# Patient Record
Sex: MALE | Race: WHITE | NOT HISPANIC OR LATINO | Employment: FULL TIME | URBAN - METROPOLITAN AREA
[De-identification: names, ages, dates, MRNs, and addresses within clinical notes are randomized per-mention and may not be internally consistent; named-entity substitution may affect disease eponyms.]

---

## 2017-06-30 ENCOUNTER — APPOINTMENT (OUTPATIENT)
Dept: LAB | Facility: HOSPITAL | Age: 42
End: 2017-06-30
Payer: COMMERCIAL

## 2017-06-30 ENCOUNTER — TRANSCRIBE ORDERS (OUTPATIENT)
Dept: LAB | Facility: HOSPITAL | Age: 42
End: 2017-06-30

## 2017-06-30 DIAGNOSIS — E71.41 PRIMARY CARNITINE DEFICIENCY (HCC): ICD-10-CM

## 2017-06-30 DIAGNOSIS — Z00.00 ENCOUNTER FOR GENERAL ADULT MEDICAL EXAMINATION WITHOUT ABNORMAL FINDINGS: Primary | ICD-10-CM

## 2017-06-30 LAB
ALBUMIN SERPL BCP-MCNC: 4.7 G/DL (ref 3.5–5)
ALP SERPL-CCNC: 72 U/L (ref 46–116)
ALT SERPL W P-5'-P-CCNC: 36 U/L (ref 12–78)
ANION GAP SERPL CALCULATED.3IONS-SCNC: 6 MMOL/L (ref 4–13)
AST SERPL W P-5'-P-CCNC: 67 U/L (ref 5–45)
BACTERIA UR QL AUTO: ABNORMAL /HPF
BASOPHILS # BLD AUTO: 0.03 THOUSANDS/ΜL (ref 0–0.1)
BASOPHILS NFR BLD AUTO: 1 % (ref 0–1)
BILIRUB SERPL-MCNC: 0.83 MG/DL (ref 0.2–1)
BILIRUB UR QL STRIP: ABNORMAL
BUN SERPL-MCNC: 12 MG/DL (ref 5–25)
CALCIUM SERPL-MCNC: 9.8 MG/DL (ref 8.3–10.1)
CHLORIDE SERPL-SCNC: 105 MMOL/L (ref 100–108)
CHOLEST SERPL-MCNC: 165 MG/DL (ref 50–200)
CLARITY UR: CLEAR
CO2 SERPL-SCNC: 28 MMOL/L (ref 21–32)
COLOR UR: ABNORMAL
CREAT SERPL-MCNC: 1.04 MG/DL (ref 0.6–1.3)
EOSINOPHIL # BLD AUTO: 0.09 THOUSAND/ΜL (ref 0–0.61)
EOSINOPHIL NFR BLD AUTO: 2 % (ref 0–6)
ERYTHROCYTE [DISTWIDTH] IN BLOOD BY AUTOMATED COUNT: 12.3 % (ref 11.6–15.1)
GFR SERPL CREATININE-BSD FRML MDRD: >60 ML/MIN/1.73SQ M
GLUCOSE P FAST SERPL-MCNC: 97 MG/DL (ref 65–99)
GLUCOSE UR STRIP-MCNC: NEGATIVE MG/DL
HCT VFR BLD AUTO: 46.1 % (ref 36.5–49.3)
HDLC SERPL-MCNC: 77 MG/DL (ref 40–60)
HGB BLD-MCNC: 15.3 G/DL (ref 12–17)
HGB UR QL STRIP.AUTO: NEGATIVE
HYALINE CASTS #/AREA URNS LPF: ABNORMAL /LPF
KETONES UR STRIP-MCNC: ABNORMAL MG/DL
LDLC SERPL CALC-MCNC: 80 MG/DL (ref 0–100)
LEUKOCYTE ESTERASE UR QL STRIP: ABNORMAL
LYMPHOCYTES # BLD AUTO: 1.7 THOUSANDS/ΜL (ref 0.6–4.47)
LYMPHOCYTES NFR BLD AUTO: 35 % (ref 14–44)
MCH RBC QN AUTO: 30.1 PG (ref 26.8–34.3)
MCHC RBC AUTO-ENTMCNC: 33.2 G/DL (ref 31.4–37.4)
MCV RBC AUTO: 91 FL (ref 82–98)
MONOCYTES # BLD AUTO: 0.44 THOUSAND/ΜL (ref 0.17–1.22)
MONOCYTES NFR BLD AUTO: 9 % (ref 4–12)
NEUTROPHILS # BLD AUTO: 2.55 THOUSANDS/ΜL (ref 1.85–7.62)
NEUTS SEG NFR BLD AUTO: 53 % (ref 43–75)
NITRITE UR QL STRIP: NEGATIVE
NON-SQ EPI CELLS URNS QL MICRO: ABNORMAL /HPF
NRBC BLD AUTO-RTO: 0 /100 WBCS
PH UR STRIP.AUTO: 8.5 [PH] (ref 4.5–8)
PLATELET # BLD AUTO: 223 THOUSANDS/UL (ref 149–390)
PMV BLD AUTO: 10.5 FL (ref 8.9–12.7)
POTASSIUM SERPL-SCNC: 4.8 MMOL/L (ref 3.5–5.3)
PROT SERPL-MCNC: 8 G/DL (ref 6.4–8.2)
PROT UR STRIP-MCNC: ABNORMAL MG/DL
PSA SERPL-MCNC: 0.6 NG/ML (ref 0–4)
RBC # BLD AUTO: 5.08 MILLION/UL (ref 3.88–5.62)
RBC #/AREA URNS AUTO: ABNORMAL /HPF
SODIUM SERPL-SCNC: 139 MMOL/L (ref 136–145)
SP GR UR STRIP.AUTO: 1.03 (ref 1–1.03)
TRIGL SERPL-MCNC: 40 MG/DL
UROBILINOGEN UR QL STRIP.AUTO: 1 E.U./DL
WBC # BLD AUTO: 4.82 THOUSAND/UL (ref 4.31–10.16)
WBC #/AREA URNS AUTO: ABNORMAL /HPF

## 2017-06-30 PROCEDURE — 81001 URINALYSIS AUTO W/SCOPE: CPT | Performed by: INTERNAL MEDICINE

## 2017-06-30 PROCEDURE — G0103 PSA SCREENING: HCPCS

## 2017-06-30 PROCEDURE — 36415 COLL VENOUS BLD VENIPUNCTURE: CPT

## 2017-06-30 PROCEDURE — 80053 COMPREHEN METABOLIC PANEL: CPT

## 2017-06-30 PROCEDURE — 85025 COMPLETE CBC W/AUTO DIFF WBC: CPT

## 2017-06-30 PROCEDURE — 80061 LIPID PANEL: CPT

## 2017-07-03 ENCOUNTER — APPOINTMENT (OUTPATIENT)
Dept: LAB | Facility: HOSPITAL | Age: 42
End: 2017-07-03
Payer: COMMERCIAL

## 2017-07-03 DIAGNOSIS — Z00.00 ENCOUNTER FOR GENERAL ADULT MEDICAL EXAMINATION WITHOUT ABNORMAL FINDINGS: ICD-10-CM

## 2017-07-03 LAB
BILIRUB UR QL STRIP: NEGATIVE
CLARITY UR: ABNORMAL
COLOR UR: YELLOW
FOLATE SERPL-MCNC: 15.8 NG/ML (ref 3.1–17.5)
GLUCOSE UR STRIP-MCNC: NEGATIVE MG/DL
HGB UR QL STRIP.AUTO: NEGATIVE
KETONES UR STRIP-MCNC: NEGATIVE MG/DL
LEUKOCYTE ESTERASE UR QL STRIP: NEGATIVE
NITRITE UR QL STRIP: NEGATIVE
PH UR STRIP.AUTO: 8.5 [PH] (ref 4.5–8)
PROT UR STRIP-MCNC: NEGATIVE MG/DL
SP GR UR STRIP.AUTO: 1.02 (ref 1–1.03)
UROBILINOGEN UR QL STRIP.AUTO: 1 E.U./DL
VIT B12 SERPL-MCNC: 635 PG/ML (ref 100–900)

## 2017-07-03 PROCEDURE — 36415 COLL VENOUS BLD VENIPUNCTURE: CPT

## 2017-07-03 PROCEDURE — 84630 ASSAY OF ZINC: CPT

## 2017-07-03 PROCEDURE — 82607 VITAMIN B-12: CPT

## 2017-07-03 PROCEDURE — 81003 URINALYSIS AUTO W/O SCOPE: CPT | Performed by: INTERNAL MEDICINE

## 2017-07-03 PROCEDURE — 82746 ASSAY OF FOLIC ACID SERUM: CPT

## 2017-07-03 PROCEDURE — 87086 URINE CULTURE/COLONY COUNT: CPT

## 2017-07-04 LAB — BACTERIA UR CULT: NORMAL

## 2017-07-06 LAB — ZINC SERPL-MCNC: 86 UG/DL (ref 56–134)

## 2017-07-12 LAB
CARN ESTERS/C0 SERPL-SRTO: 0.5 RATIO (ref 0.1–0.9)
CARNITINE FREE SERPL-SCNC: 38 UMOL/L (ref 16–60)
CARNITINE SERPL-SCNC: 56 UMOL/L (ref 25–69)
STONE ANALYSIS-IMP: NORMAL

## 2018-06-15 ENCOUNTER — TRANSCRIBE ORDERS (OUTPATIENT)
Dept: LAB | Facility: HOSPITAL | Age: 43
End: 2018-06-15

## 2018-06-15 ENCOUNTER — APPOINTMENT (OUTPATIENT)
Dept: LAB | Facility: HOSPITAL | Age: 43
End: 2018-06-15
Payer: COMMERCIAL

## 2018-06-15 DIAGNOSIS — A51.31 CONDYLOMA LATUM: ICD-10-CM

## 2018-06-15 DIAGNOSIS — Z00.01 ENCOUNTER FOR GENERAL ADULT MEDICAL EXAMINATION WITH ABNORMAL FINDINGS: ICD-10-CM

## 2018-06-15 DIAGNOSIS — A51.31 CONDYLOMA LATUM: Primary | ICD-10-CM

## 2018-06-15 LAB
25(OH)D3 SERPL-MCNC: 35.4 NG/ML (ref 30–100)
ALBUMIN SERPL BCP-MCNC: 4.7 G/DL (ref 3.5–5)
ALP SERPL-CCNC: 66 U/L (ref 46–116)
ALT SERPL W P-5'-P-CCNC: 24 U/L (ref 12–78)
ANION GAP SERPL CALCULATED.3IONS-SCNC: 3 MMOL/L (ref 4–13)
AST SERPL W P-5'-P-CCNC: 18 U/L (ref 5–45)
BACTERIA UR QL AUTO: ABNORMAL /HPF
BASOPHILS # BLD AUTO: 0.04 THOUSANDS/ΜL (ref 0–0.1)
BASOPHILS NFR BLD AUTO: 1 % (ref 0–1)
BILIRUB SERPL-MCNC: 0.96 MG/DL (ref 0.2–1)
BILIRUB UR QL STRIP: NEGATIVE
BUN SERPL-MCNC: 11 MG/DL (ref 5–25)
CALCIUM SERPL-MCNC: 9.6 MG/DL (ref 8.3–10.1)
CHLORIDE SERPL-SCNC: 104 MMOL/L (ref 100–108)
CHOLEST SERPL-MCNC: 137 MG/DL (ref 50–200)
CLARITY UR: ABNORMAL
CO2 SERPL-SCNC: 31 MMOL/L (ref 21–32)
COLOR UR: ABNORMAL
CREAT SERPL-MCNC: 0.96 MG/DL (ref 0.6–1.3)
EOSINOPHIL # BLD AUTO: 0.06 THOUSAND/ΜL (ref 0–0.61)
EOSINOPHIL NFR BLD AUTO: 1 % (ref 0–6)
ERYTHROCYTE [DISTWIDTH] IN BLOOD BY AUTOMATED COUNT: 11.8 % (ref 11.6–15.1)
FOLATE SERPL-MCNC: >20 NG/ML (ref 3.1–17.5)
GFR SERPL CREATININE-BSD FRML MDRD: 98 ML/MIN/1.73SQ M
GLUCOSE P FAST SERPL-MCNC: 93 MG/DL (ref 65–99)
GLUCOSE UR STRIP-MCNC: NEGATIVE MG/DL
HCT VFR BLD AUTO: 47.5 % (ref 36.5–49.3)
HDLC SERPL-MCNC: 62 MG/DL (ref 40–60)
HGB BLD-MCNC: 15.1 G/DL (ref 12–17)
HGB UR QL STRIP.AUTO: NEGATIVE
HYALINE CASTS #/AREA URNS LPF: ABNORMAL /LPF
IMM GRANULOCYTES # BLD AUTO: 0.02 THOUSAND/UL (ref 0–0.2)
IMM GRANULOCYTES NFR BLD AUTO: 0 % (ref 0–2)
KETONES UR STRIP-MCNC: NEGATIVE MG/DL
LDLC SERPL CALC-MCNC: 62 MG/DL (ref 0–100)
LDLC SERPL DIRECT ASSAY-MCNC: 72 MG/DL (ref 0–100)
LEUKOCYTE ESTERASE UR QL STRIP: NEGATIVE
LYMPHOCYTES # BLD AUTO: 1.36 THOUSANDS/ΜL (ref 0.6–4.47)
LYMPHOCYTES NFR BLD AUTO: 19 % (ref 14–44)
MCH RBC QN AUTO: 29.3 PG (ref 26.8–34.3)
MCHC RBC AUTO-ENTMCNC: 31.8 G/DL (ref 31.4–37.4)
MCV RBC AUTO: 92 FL (ref 82–98)
MONOCYTES # BLD AUTO: 0.46 THOUSAND/ΜL (ref 0.17–1.22)
MONOCYTES NFR BLD AUTO: 7 % (ref 4–12)
NEUTROPHILS # BLD AUTO: 5.08 THOUSANDS/ΜL (ref 1.85–7.62)
NEUTS SEG NFR BLD AUTO: 72 % (ref 43–75)
NITRITE UR QL STRIP: NEGATIVE
NON-SQ EPI CELLS URNS QL MICRO: ABNORMAL /HPF
NONHDLC SERPL-MCNC: 75 MG/DL
NRBC BLD AUTO-RTO: 0 /100 WBCS
PH UR STRIP.AUTO: 8.5 [PH] (ref 4.5–8)
PLATELET # BLD AUTO: 230 THOUSANDS/UL (ref 149–390)
PMV BLD AUTO: 10.5 FL (ref 8.9–12.7)
POTASSIUM SERPL-SCNC: 4.3 MMOL/L (ref 3.5–5.3)
PROT SERPL-MCNC: 7.7 G/DL (ref 6.4–8.2)
PROT UR STRIP-MCNC: ABNORMAL MG/DL
RBC # BLD AUTO: 5.16 MILLION/UL (ref 3.88–5.62)
RBC #/AREA URNS AUTO: ABNORMAL /HPF
RPR SER QL: NORMAL
SODIUM SERPL-SCNC: 138 MMOL/L (ref 136–145)
SP GR UR STRIP.AUTO: 1.02 (ref 1–1.03)
TRIGL SERPL-MCNC: 65 MG/DL
UROBILINOGEN UR QL STRIP.AUTO: 1 E.U./DL
VIT B12 SERPL-MCNC: 845 PG/ML (ref 100–900)
WBC # BLD AUTO: 7.02 THOUSAND/UL (ref 4.31–10.16)
WBC #/AREA URNS AUTO: ABNORMAL /HPF

## 2018-06-15 PROCEDURE — 83721 ASSAY OF BLOOD LIPOPROTEIN: CPT

## 2018-06-15 PROCEDURE — 86695 HERPES SIMPLEX TYPE 1 TEST: CPT

## 2018-06-15 PROCEDURE — 80053 COMPREHEN METABOLIC PANEL: CPT

## 2018-06-15 PROCEDURE — 82607 VITAMIN B-12: CPT

## 2018-06-15 PROCEDURE — 82306 VITAMIN D 25 HYDROXY: CPT

## 2018-06-15 PROCEDURE — 86696 HERPES SIMPLEX TYPE 2 TEST: CPT

## 2018-06-15 PROCEDURE — 81001 URINALYSIS AUTO W/SCOPE: CPT

## 2018-06-15 PROCEDURE — 86592 SYPHILIS TEST NON-TREP QUAL: CPT

## 2018-06-15 PROCEDURE — 80061 LIPID PANEL: CPT

## 2018-06-15 PROCEDURE — 87491 CHLMYD TRACH DNA AMP PROBE: CPT

## 2018-06-15 PROCEDURE — 87591 N.GONORRHOEAE DNA AMP PROB: CPT

## 2018-06-15 PROCEDURE — 36415 COLL VENOUS BLD VENIPUNCTURE: CPT

## 2018-06-15 PROCEDURE — 82746 ASSAY OF FOLIC ACID SERUM: CPT

## 2018-06-15 PROCEDURE — 85025 COMPLETE CBC W/AUTO DIFF WBC: CPT

## 2018-06-16 LAB
HSV1 IGG SER IA-ACNC: 51.7 INDEX (ref 0–0.9)
HSV2 IGG SER IA-ACNC: <0.91 INDEX (ref 0–0.9)

## 2018-06-18 LAB
CHLAMYDIA DNA CVX QL NAA+PROBE: NORMAL
N GONORRHOEA DNA GENITAL QL NAA+PROBE: NORMAL

## 2018-06-19 LAB
CARN ESTERS/C0 SERPL-SRTO: 0.5 RATIO (ref 0–0.9)
CARNITINE FREE SERPL-SCNC: 27 UMOL/L (ref 20–55)
CARNITINE SERPL-SCNC: 40 UMOL/L (ref 27–73)
STONE ANALYSIS-IMP: NORMAL

## 2018-07-09 ENCOUNTER — APPOINTMENT (OUTPATIENT)
Dept: LAB | Facility: HOSPITAL | Age: 43
End: 2018-07-09
Payer: COMMERCIAL

## 2018-07-09 DIAGNOSIS — Z00.01 ENCOUNTER FOR GENERAL ADULT MEDICAL EXAMINATION WITH ABNORMAL FINDINGS: ICD-10-CM

## 2018-07-09 DIAGNOSIS — A51.31 CONDYLOMA LATUM: ICD-10-CM

## 2018-07-09 PROCEDURE — 86696 HERPES SIMPLEX TYPE 2 TEST: CPT

## 2018-07-09 PROCEDURE — 86695 HERPES SIMPLEX TYPE 1 TEST: CPT

## 2018-07-10 LAB
HSV1 IGG SER IA-ACNC: 51.9 INDEX (ref 0–0.9)
HSV2 IGG SER IA-ACNC: <0.91 INDEX (ref 0–0.9)

## 2018-08-01 ENCOUNTER — OFFICE VISIT (OUTPATIENT)
Dept: UROLOGY | Facility: AMBULATORY SURGERY CENTER | Age: 43
End: 2018-08-01
Payer: COMMERCIAL

## 2018-08-01 ENCOUNTER — TELEPHONE (OUTPATIENT)
Dept: UROLOGY | Facility: AMBULATORY SURGERY CENTER | Age: 43
End: 2018-08-01

## 2018-08-01 VITALS
DIASTOLIC BLOOD PRESSURE: 68 MMHG | SYSTOLIC BLOOD PRESSURE: 110 MMHG | WEIGHT: 143.4 LBS | BODY MASS INDEX: 20.08 KG/M2 | HEART RATE: 66 BPM | HEIGHT: 71 IN

## 2018-08-01 DIAGNOSIS — A63.0 CONDYLOMA: ICD-10-CM

## 2018-08-01 DIAGNOSIS — B00.9 HERPES SIMPLEX: Primary | ICD-10-CM

## 2018-08-01 LAB
SL AMB  POCT GLUCOSE, UA: NORMAL
SL AMB LEUKOCYTE ESTERASE,UA: NORMAL
SL AMB POCT BILIRUBIN,UA: NORMAL
SL AMB POCT BLOOD,UA: NORMAL
SL AMB POCT CLARITY,UA: CLEAR
SL AMB POCT COLOR,UA: YELLOW
SL AMB POCT KETONES,UA: NORMAL
SL AMB POCT NITRITE,UA: NORMAL
SL AMB POCT PH,UA: 8
SL AMB POCT SPECIFIC GRAVITY,UA: 1
SL AMB POCT URINE PROTEIN: NORMAL
SL AMB POCT UROBILINOGEN: NORMAL

## 2018-08-01 PROCEDURE — 81002 URINALYSIS NONAUTO W/O SCOPE: CPT | Performed by: UROLOGY

## 2018-08-01 PROCEDURE — 99203 OFFICE O/P NEW LOW 30 MIN: CPT | Performed by: UROLOGY

## 2018-08-01 RX ORDER — FINASTERIDE 1 MG/1
1 TABLET, FILM COATED ORAL
Refills: 2 | COMMUNITY
Start: 2018-06-02

## 2018-08-01 RX ORDER — VALACYCLOVIR HYDROCHLORIDE 1 G/1
1000 TABLET, FILM COATED ORAL EVERY 12 HOURS
Refills: 5 | COMMUNITY
Start: 2018-07-28 | End: 2018-11-08

## 2018-08-01 NOTE — PROGRESS NOTES
8/1/2018    Kilo Fall  1975  [id number]        Assessment  Possible right groin condyloma      Discussion  I recommend sharp excision of the right groin condyloma  The patient is concerned about scarring and has requested evaluation by Plastic surgery  A referral has been made  I also explained to the patient that with his negative HSV 2 antibody and current physical examination he does not appear to have active herpes  We did discuss discontinuing the valacyclovir  He will return in follow-up after his evaluation and excision with Plastic surgery is completed  History of Present Illness  41 y o  male with a history of a right groin skin lesion concerning for condyloma  The patient states he was treated with imiquimod  He also states that his family physician was concerned about possible genital herpes and started him on valacyclovir  HSV 2 antibody testing is negative for reactivity  He is not currently in a relationship  He denies any prior history of STDs  Recent chlamydia and gonorrhea testing are negative  RPR is negative as well  AUA Symptom Score  AUA SYMPTOM SCORE      Most Recent Value   AUA SYMPTOM SCORE   How often have you had a sensation of not emptying your bladder completely after you finished urinating? 0   How often have you had to urinate again less than two hours after you finished urinating? 2   How often have you found you stopped and started again several times when you urinate? 1   How often have you found it difficult to postpone urination? 3   How often have you had a weak urinary stream?  1   How often have you had to push or strain to begin urination? 0   How many times did you most typically get up to urinate from the time you went to bed at night until the time you got up in the morning?   1   Quality of Life: If you were to spend the rest of your life with your urinary condition just the way it is now, how would you feel about that?  2   AUA SYMPTOM SCORE  8          Review of Systems  Review of Systems   Constitutional: Negative  HENT: Negative  Eyes: Negative  Respiratory: Negative  Cardiovascular: Negative  Gastrointestinal: Negative  Endocrine: Negative  Genitourinary: Negative  Musculoskeletal: Negative  Skin: Negative  Allergic/Immunologic: Negative  Neurological: Negative  Hematological: Negative  Psychiatric/Behavioral: Negative  Past Medical History  Past Medical History:   Diagnosis Date    Genital warts     Herpes simplex        Past Social History  History reviewed  No pertinent surgical history  Past Family History  Family History   Problem Relation Age of Onset    Cancer Father     Cancer Mother     Heart attack Maternal Grandmother        Past Social history  Social History     Social History    Marital status: /Civil Union     Spouse name: N/A    Number of children: N/A    Years of education: N/A     Occupational History    Not on file  Social History Main Topics    Smoking status: Never Smoker    Smokeless tobacco: Never Used    Alcohol use Yes      Comment: Occasionally    Drug use: Unknown    Sexual activity: Not on file     Other Topics Concern    Not on file     Social History Narrative    No narrative on file       Current Medications  Current Outpatient Prescriptions   Medication Sig Dispense Refill    finasteride (PROPECIA) 1 MG tablet Take 1 mg by mouth daily  2    MINOXIDIL, TOPICAL, 5 % SOLN Apply topically 2 (two) times a day      valACYclovir (VALTREX) 1,000 mg tablet Take 1,000 mg by mouth every 12 (twelve) hours  5     No current facility-administered medications for this visit  Allergies  No Known Allergies    Past Medical History, Social History, Family History, medications and allergies were reviewed      Vitals  Vitals:    08/01/18 0927   BP: 110/68   BP Location: Right arm   Patient Position: Sitting   Cuff Size: Adult   Pulse: 66 Weight: 65 kg (143 lb 6 4 oz)   Height: 5' 11" (1 803 m)       Physical Exam  Physical Exam  On examination he is in no acute distress  His abdomen is soft nontender nondistended  In the right groin a 1 cm x 1 cm raised skin lesion possibly consistent with condyloma is identified  Mild hyperpigmentation of the skin just below and lateral to the skin lesion is identified  The patient states that this is secondary to the imiquimod  A 2nd smaller more lateral lesion is identified which does not appear to be consistent with condyloma      Results  Lab Results   Component Value Date    PSA 0 6 06/30/2017     Lab Results   Component Value Date    CALCIUM 9 6 06/15/2018     06/15/2018    K 4 3 06/15/2018    CO2 31 06/15/2018     06/15/2018    BUN 11 06/15/2018    CREATININE 0 96 06/15/2018     Lab Results   Component Value Date    WBC 7 02 06/15/2018    HGB 15 1 06/15/2018    HCT 47 5 06/15/2018    MCV 92 06/15/2018     06/15/2018         Office Urine Dip  Recent Results (from the past 1 hour(s))   POCT urine dip    Collection Time: 08/01/18  9:36 AM   Result Value Ref Range    LEUKOCYTE ESTERASE,UA -      NITRITE,UA -     SL AMB POCT UROBILINOGEN -     SL AMB POCT URINE PROTEIN 100 ++      PH,UA 8 0      BLOOD,UA -      SPECIFIC GRAVITY,UA 1 005      KETONES,UA -      BILIRUBIN,UA -     GLUCOSE, UA -      COLOR,UA yellow      CLARITY,UA clear    ]

## 2018-08-01 NOTE — LETTER
August 1, 2018     Kingston Sandhu MD  [address] [address]    Patient: Yazmin King   YOB: 1975   Date of Visit: 8/1/2018       Dear Dr Miguel Castle:    Thank you for referring Yazmin King to me for evaluation  Below are my notes for this consultation  If you have questions, please do not hesitate to call me  I look forward to following your patient along with you  Sincerely,        Nikos Wiggins MD        CC: MD Nikos Chauhan MD  8/1/2018 10:26 AM  Sign at close encounter  8/1/2018    Yazmin King  1975  [id number]        Assessment  Possible right groin condyloma      Discussion  I recommend sharp excision of the right groin condyloma  The patient is concerned about scarring and has requested evaluation by Plastic surgery  A referral has been made  I also explained to the patient that with his negative HSV 2 antibody and current physical examination he does not appear to have active herpes  We did discuss discontinuing the valacyclovir  He will return in follow-up after his evaluation and excision with Plastic surgery is completed  History of Present Illness  41 y o  male with a history of a right groin skin lesion concerning for condyloma  The patient states he was treated with imiquimod  He also states that his family physician was concerned about possible genital herpes and started him on valacyclovir  HSV 2 antibody testing is negative for reactivity  He is not currently in a relationship  He denies any prior history of STDs  Recent chlamydia and gonorrhea testing are negative  RPR is negative as well  AUA Symptom Score  AUA SYMPTOM SCORE      Most Recent Value   AUA SYMPTOM SCORE   How often have you had a sensation of not emptying your bladder completely after you finished urinating? 0   How often have you had to urinate again less than two hours after you finished urinating?   2   How often have you found you stopped and started again several times when you urinate? 1   How often have you found it difficult to postpone urination? 3   How often have you had a weak urinary stream?  1   How often have you had to push or strain to begin urination? 0   How many times did you most typically get up to urinate from the time you went to bed at night until the time you got up in the morning? 1   Quality of Life: If you were to spend the rest of your life with your urinary condition just the way it is now, how would you feel about that?  2   AUA SYMPTOM SCORE  8          Review of Systems  Review of Systems   Constitutional: Negative  HENT: Negative  Eyes: Negative  Respiratory: Negative  Cardiovascular: Negative  Gastrointestinal: Negative  Endocrine: Negative  Genitourinary: Negative  Musculoskeletal: Negative  Skin: Negative  Allergic/Immunologic: Negative  Neurological: Negative  Hematological: Negative  Psychiatric/Behavioral: Negative  Past Medical History  Past Medical History:   Diagnosis Date    Genital warts     Herpes simplex        Past Social History  History reviewed  No pertinent surgical history  Past Family History  Family History   Problem Relation Age of Onset    Cancer Father     Cancer Mother     Heart attack Maternal Grandmother        Past Social history  Social History     Social History    Marital status: /Civil Union     Spouse name: N/A    Number of children: N/A    Years of education: N/A     Occupational History    Not on file       Social History Main Topics    Smoking status: Never Smoker    Smokeless tobacco: Never Used    Alcohol use Yes      Comment: Occasionally    Drug use: Unknown    Sexual activity: Not on file     Other Topics Concern    Not on file     Social History Narrative    No narrative on file       Current Medications  Current Outpatient Prescriptions   Medication Sig Dispense Refill  finasteride (PROPECIA) 1 MG tablet Take 1 mg by mouth daily  2    MINOXIDIL, TOPICAL, 5 % SOLN Apply topically 2 (two) times a day      valACYclovir (VALTREX) 1,000 mg tablet Take 1,000 mg by mouth every 12 (twelve) hours  5     No current facility-administered medications for this visit  Allergies  No Known Allergies    Past Medical History, Social History, Family History, medications and allergies were reviewed  Vitals  Vitals:    08/01/18 0927   BP: 110/68   BP Location: Right arm   Patient Position: Sitting   Cuff Size: Adult   Pulse: 66   Weight: 65 kg (143 lb 6 4 oz)   Height: 5' 11" (1 803 m)       Physical Exam  Physical Exam  On examination he is in no acute distress  His abdomen is soft nontender nondistended  In the right groin a 1 cm x 1 cm raised skin lesion possibly consistent with condyloma is identified  Mild hyperpigmentation of the skin just below and lateral to the skin lesion is identified  The patient states that this is secondary to the imiquimod  A 2nd smaller more lateral lesion is identified which does not appear to be consistent with condyloma      Results  Lab Results   Component Value Date    PSA 0 6 06/30/2017     Lab Results   Component Value Date    CALCIUM 9 6 06/15/2018     06/15/2018    K 4 3 06/15/2018    CO2 31 06/15/2018     06/15/2018    BUN 11 06/15/2018    CREATININE 0 96 06/15/2018     Lab Results   Component Value Date    WBC 7 02 06/15/2018    HGB 15 1 06/15/2018    HCT 47 5 06/15/2018    MCV 92 06/15/2018     06/15/2018         Office Urine Dip  Recent Results (from the past 1 hour(s))   POCT urine dip    Collection Time: 08/01/18  9:36 AM   Result Value Ref Range    LEUKOCYTE ESTERASE,UA -      NITRITE,UA -     SL AMB POCT UROBILINOGEN -     SL AMB POCT URINE PROTEIN 100 ++      PH,UA 8 0      BLOOD,UA -      SPECIFIC GRAVITY,UA 1 005      KETONES,UA -      BILIRUBIN,UA -     GLUCOSE, UA -      COLOR,UA yellow      CLARITY,UA clear ]

## 2018-08-01 NOTE — TELEPHONE ENCOUNTER
Discussed situation with Dr Scott Bauer had me call [facility] to explain that lesion is not on the genitalia, that it was in the groin area  I called  Plastic surgery, [phone] and spoke with Heather Jara, she reported to me that Plastic surgery would not remove lesion on groin area  Reported back to Dr Scott Bauer, per Dr Scott Bauer, patient can be seen as office procedure to have groin lesion removed  I called patient, informed him that I had also called [facility], but was told they don't remove groin lesion  Informed patient Dr Scott Bauer would be willing to see him in the office to remove lesion  Patient reports concern with scarring  Patient states he is going to try to seek out another plastic surgeon   If patient unsuccessful, he will call our office to schedule office procedure for lesion removal

## 2018-08-01 NOTE — TELEPHONE ENCOUNTER
Spoke with patient, he said Dr Yonatan Jama referred him to plastic surgery for removal of genital warts  They do not remove the warts there  He wants to know who  He can go to now?

## 2018-09-07 ENCOUNTER — OFFICE VISIT (OUTPATIENT)
Dept: PLASTIC SURGERY | Facility: CLINIC | Age: 43
End: 2018-09-07
Payer: COMMERCIAL

## 2018-09-07 VITALS — HEIGHT: 71 IN | WEIGHT: 145 LBS | BODY MASS INDEX: 20.3 KG/M2

## 2018-09-07 DIAGNOSIS — Q17.3 LOP EAR: Primary | ICD-10-CM

## 2018-09-07 DIAGNOSIS — A63.0 CONDYLOMA: ICD-10-CM

## 2018-09-07 PROCEDURE — 99203 OFFICE O/P NEW LOW 30 MIN: CPT | Performed by: SURGERY

## 2018-09-07 NOTE — PROGRESS NOTES
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 who had a previous otoplasty as a [place]  The lop ear deformity recurred  He presents today for evaluation and correction  I recommended revision otoplasty  I would resect a portion of the medial posterior devang bowl and suture the cartilage back to mastoid fascia  I would also contour the helical rim with cartilage scoring and mustarde sutures  I discussed this plan with him in detail in measured as were obtained  I discussed risks, benefits and alternatives of the procedure including not limited to risk of bleeding, infection, scar and recurrent ear deformity  He understood and wished to proceed  I personally obtained his informed consent  We will submit to his insurance for prior authorization  Condyloma  46-year-old male with right inguinal region condylomata  He requested to see a plastic surgeon for excision and closure to minimize scarring  I am seeing him in consultation for urology  I recommended excision with complex closure  It he has condylomata in a 6 millimeters and 3 millimeters I discussed with him risks, benefits and alternatives of the procedure including not limited to risk of bleeding, infection, scar and recurrence  He understood and wished to proceed  I personally obtained his informed consent  We will schedule him for surgery on an outpatient basis  Diagnoses and all orders for this visit:    Lop ear    Condyloma  -     Ambulatory referral to Plastic Surgery          Subjective:      Patient ID: Monica Werner is a 41 y o  male  46-year-old male with right inguinal region condylomata  He requested to see a plastic surgeon for excision and closure to minimize scarring  I am seeing him in consultation for urology            The following portions of the patient's history were reviewed and updated as appropriate: allergies, current medications, past family history, past medical history, past social history, past surgical history and problem list     Review of Systems   Constitutional: Negative  HENT:        As noted in HPI   Eyes: Negative  Respiratory: Negative  Cardiovascular: Negative  Gastrointestinal: Negative  Endocrine: Negative  Genitourinary:        As noted in HPI   Musculoskeletal: Negative  Skin: Negative  Allergic/Immunologic: Negative  Neurological: Negative  Hematological: Negative  Psychiatric/Behavioral: Negative  Objective:      Ht 5' 11" (1 803 m)   Wt 65 8 kg (145 lb)   BMI 20 22 kg/m²          Physical Exam   HENT:   Right ear distance from helix to mastoid 7 millimeters, 7 millimeters, 7 millimeters, 7 millimeters and 9 millimeters    Left ear distance from helix to mastoid 7 millimeters, 13 millimeters, 16 millimeters, 20 millimeters, 9 millimeters     Genitourinary:   Genitourinary Comments: Condylomata 6 millimeters and 3 millimeters in right inguinal region

## 2018-11-08 RX ORDER — MULTIVITAMIN
1 CAPSULE ORAL DAILY
COMMUNITY

## 2018-11-08 RX ORDER — ACETAMINOPHEN 500 MG
500 TABLET ORAL EVERY 6 HOURS PRN
COMMUNITY
End: 2019-07-24 | Stop reason: ALTCHOICE

## 2018-11-08 RX ORDER — COVID-19 ANTIGEN TEST
KIT MISCELLANEOUS
COMMUNITY
End: 2019-07-24 | Stop reason: ALTCHOICE

## 2018-11-08 RX ORDER — CHLORAL HYDRATE 500 MG
1000 CAPSULE ORAL DAILY
COMMUNITY

## 2018-11-08 NOTE — PERIOPERATIVE NURSING NOTE
Patient states he doesn't have a  for surgery  He was instructed that he should call the surgeon's office to let them know this to see if they have any services available, and that he must have a  to take him home

## 2018-11-08 NOTE — PRE-PROCEDURE INSTRUCTIONS
Pre-Surgery Instructions:   Medication Instructions    acetaminophen (TYLENOL) 500 mg tablet Instructed patient per Anesthesia Guidelines   finasteride (PROPECIA) 1 MG tablet Instructed patient per Anesthesia Guidelines   MINOXIDIL, TOPICAL, 5 % SOLN Instructed patient per Anesthesia Guidelines   Multiple Vitamin (MULTIVITAMIN) capsule Instructed patient per Anesthesia Guidelines   Naproxen Sodium (ALEVE) 220 MG CAPS Instructed patient per Anesthesia Guidelines   Omega-3 Fatty Acids (FISH OIL) 1,000 mg Instructed patient per Anesthesia Guidelines  Per anesthesia patient was told to stop NSAIDS and supplements one week preop and no medications are needed on morning of surgery  Instructed on use of Chlorhexidine for preoperative bathing per hospital protocol

## 2018-11-22 NOTE — H&P
H&P - Plastic Surgery   Patito Evans 41 y o  male MRN: [id number]  Unit/Bed#:  Encounter: [id number]        Assessment/Plan:     Lop ear  24-year-old male with a lop ear deformity since birth who had a previous otop[place] as a [place]  The lop ear deformity recurred  He presents today for evaluation and correction      I recommended revision otoplasty  I would resect a portion of the medial posterior devang bowl and suture the cartilage back to mastoid fascia  I would also contour the helical rim with cartilage scoring and mustarde sutures  I discussed this plan with him in detail in measured as were obtained  I discussed risks, benefits and alternatives of the procedure including not limited to risk of bleeding, infection, scar and recurrent ear deformity  He understood and wished to proceed  I personally obtained his informed consent      We will submit to his insurance for prior authorization      Condyloma  24-year-old male with right inguinal region condylomata  He requested to see a plastic surgeon for excision and closure to minimize scarring  I am seeing him in consultation for urology      I recommended excision with complex closure  It he has condylomata in a 6 millimeters and 3 millimeters I discussed with him risks, benefits and alternatives of the procedure including not limited to risk of bleeding, infection, scar and recurrence  He understood and wished to proceed  I personally obtained his informed consent  We will schedule him for surgery on an outpatient basis         Diagnoses and all orders for this visit:     Lop ear     Condyloma  -     Ambulatory referral to Plastic Surgery            Subjective:       Patient ID: Patito Evans is a 41 y o  male      24-year-old male with right inguinal region condylomata  He requested to see a plastic surgeon for excision and closure to minimize scarring    I am seeing him in consultation for urology            The following portions of the patient's history were reviewed and updated as appropriate: allergies, current medications, past family history, past medical history, past social history, past surgical history and problem list      Review of Systems   Constitutional: Negative  HENT:        As noted in HPI   Eyes: Negative  Respiratory: Negative  Cardiovascular: Negative  Gastrointestinal: Negative  Endocrine: Negative  Genitourinary:        As noted in HPI   Musculoskeletal: Negative  Skin: Negative  Allergic/Immunologic: Negative  Neurological: Negative  Hematological: Negative  Psychiatric/Behavioral: Negative            Objective:        Ht 5' 11" (1 803 m)   Wt 65 8 kg (145 lb)   BMI 20 22 kg/m²             Physical Exam   HENT:   Right ear distance from helix to mastoid 7 millimeters, 7 millimeters, 7 millimeters, 7 millimeters and 9 millimeters    Left ear distance from helix to mastoid 7 millimeters, 13 millimeters, 16 millimeters, 20 millimeters, 9 millimeters     Genitourinary:   Genitourinary Comments: Condylomata 6 millimeters and 3 millimeters in right inguinal region     Historical Information   Past Medical History:   Diagnosis Date    Genital warts     Herpes simplex     Wears contact lenses     Wears glasses      Past Surgical History:   Procedure Laterality Date    EAR SURGERY Bilateral      Social History   History   Alcohol Use    Yes     Comment: On average has 3 drinks weekly     History   Drug Use No     History   Smoking Status    Never Smoker   Smokeless Tobacco    Never Used     Family History:   Family History   Problem Relation Age of Onset    Cancer Father     Cancer Mother     Heart attack Maternal Grandmother        Meds/Allergies   all current active meds have been reviewed      Objective         Lab Results:   Lab Results   Component Value Date    WBC 7 02 06/15/2018    HGB 15 1 06/15/2018    HCT 47 5 06/15/2018    MCV 92 06/15/2018     06/15/2018          No results found for: TISSUECULT, WOUNDCULT      Imaging Studies:   No results found      EKG, Pathology, and Other Studies:   No results found for: FINALDX    No intake or output data in the 24 hours ending 11/21/18 5239    Invasive Devices          No matching active lines, drains, or airways          VTE Prophylaxis: Sequential compression device Sulaiman Holter)           Exam by Dr Janina Diehl

## 2018-11-23 ENCOUNTER — APPOINTMENT (OUTPATIENT)
Dept: LAB | Facility: HOSPITAL | Age: 43
End: 2018-11-23
Attending: SURGERY
Payer: COMMERCIAL

## 2018-11-23 DIAGNOSIS — A63.0 CONDYLOMA: ICD-10-CM

## 2018-11-23 DIAGNOSIS — Q17.3 LOP EAR: ICD-10-CM

## 2018-11-23 LAB
ANION GAP SERPL CALCULATED.3IONS-SCNC: 3 MMOL/L (ref 4–13)
BASOPHILS # BLD AUTO: 0.03 THOUSANDS/ΜL (ref 0–0.1)
BASOPHILS NFR BLD AUTO: 1 % (ref 0–1)
BUN SERPL-MCNC: 11 MG/DL (ref 5–25)
CALCIUM SERPL-MCNC: 10.1 MG/DL (ref 8.3–10.1)
CHLORIDE SERPL-SCNC: 102 MMOL/L (ref 100–108)
CO2 SERPL-SCNC: 30 MMOL/L (ref 21–32)
CREAT SERPL-MCNC: 0.96 MG/DL (ref 0.6–1.3)
EOSINOPHIL # BLD AUTO: 0.06 THOUSAND/ΜL (ref 0–0.61)
EOSINOPHIL NFR BLD AUTO: 1 % (ref 0–6)
ERYTHROCYTE [DISTWIDTH] IN BLOOD BY AUTOMATED COUNT: 11.9 % (ref 11.6–15.1)
GFR SERPL CREATININE-BSD FRML MDRD: 98 ML/MIN/1.73SQ M
GLUCOSE P FAST SERPL-MCNC: 98 MG/DL (ref 65–99)
HCT VFR BLD AUTO: 44.9 % (ref 36.5–49.3)
HGB BLD-MCNC: 14.5 G/DL (ref 12–17)
IMM GRANULOCYTES # BLD AUTO: 0.01 THOUSAND/UL (ref 0–0.2)
IMM GRANULOCYTES NFR BLD AUTO: 0 % (ref 0–2)
LYMPHOCYTES # BLD AUTO: 1.45 THOUSANDS/ΜL (ref 0.6–4.47)
LYMPHOCYTES NFR BLD AUTO: 33 % (ref 14–44)
MCH RBC QN AUTO: 29.6 PG (ref 26.8–34.3)
MCHC RBC AUTO-ENTMCNC: 32.3 G/DL (ref 31.4–37.4)
MCV RBC AUTO: 92 FL (ref 82–98)
MONOCYTES # BLD AUTO: 0.41 THOUSAND/ΜL (ref 0.17–1.22)
MONOCYTES NFR BLD AUTO: 9 % (ref 4–12)
NEUTROPHILS # BLD AUTO: 2.5 THOUSANDS/ΜL (ref 1.85–7.62)
NEUTS SEG NFR BLD AUTO: 56 % (ref 43–75)
NRBC BLD AUTO-RTO: 0 /100 WBCS
PLATELET # BLD AUTO: 220 THOUSANDS/UL (ref 149–390)
PMV BLD AUTO: 10.3 FL (ref 8.9–12.7)
POTASSIUM SERPL-SCNC: 4.5 MMOL/L (ref 3.5–5.3)
RBC # BLD AUTO: 4.9 MILLION/UL (ref 3.88–5.62)
SODIUM SERPL-SCNC: 135 MMOL/L (ref 136–145)
WBC # BLD AUTO: 4.46 THOUSAND/UL (ref 4.31–10.16)

## 2018-11-23 PROCEDURE — 80048 BASIC METABOLIC PNL TOTAL CA: CPT

## 2018-11-23 PROCEDURE — 85025 COMPLETE CBC W/AUTO DIFF WBC: CPT

## 2018-11-23 PROCEDURE — 36415 COLL VENOUS BLD VENIPUNCTURE: CPT

## 2018-11-26 ENCOUNTER — ANESTHESIA EVENT (OUTPATIENT)
Dept: PERIOP | Facility: HOSPITAL | Age: 43
End: 2018-11-26
Payer: COMMERCIAL

## 2018-11-27 ENCOUNTER — ANESTHESIA (OUTPATIENT)
Dept: PERIOP | Facility: HOSPITAL | Age: 43
End: 2018-11-27
Payer: COMMERCIAL

## 2018-11-27 ENCOUNTER — HOSPITAL ENCOUNTER (OUTPATIENT)
Facility: HOSPITAL | Age: 43
Setting detail: OUTPATIENT SURGERY
Discharge: HOME | End: 2018-11-27
Attending: SURGERY | Admitting: SURGERY
Payer: COMMERCIAL

## 2018-11-27 VITALS
SYSTOLIC BLOOD PRESSURE: 125 MMHG | DIASTOLIC BLOOD PRESSURE: 67 MMHG | TEMPERATURE: 99.3 F | WEIGHT: 142 LBS | HEIGHT: 71 IN | BODY MASS INDEX: 19.88 KG/M2 | RESPIRATION RATE: 18 BRPM | HEART RATE: 61 BPM | OXYGEN SATURATION: 100 %

## 2018-11-27 DIAGNOSIS — Q17.3 LOP EAR: ICD-10-CM

## 2018-11-27 DIAGNOSIS — A63.0 CONDYLOMA: ICD-10-CM

## 2018-11-27 PROCEDURE — 11401 EXC TR-EXT B9+MARG 0.6-1 CM: CPT | Performed by: SURGERY

## 2018-11-27 PROCEDURE — 88305 TISSUE EXAM BY PATHOLOGIST: CPT | Performed by: PATHOLOGY

## 2018-11-27 PROCEDURE — 69300 REVISE EXTERNAL EAR: CPT | Performed by: SURGERY

## 2018-11-27 PROCEDURE — 11400 EXC TR-EXT B9+MARG 0.5 CM<: CPT | Performed by: SURGERY

## 2018-11-27 PROCEDURE — 13100 CMPLX RPR TRUNK 1.1-2.5 CM: CPT | Performed by: SURGERY

## 2018-11-27 RX ORDER — OXYCODONE HYDROCHLORIDE AND ACETAMINOPHEN 5; 325 MG/1; MG/1
1 TABLET ORAL ONCE AS NEEDED
Status: COMPLETED | OUTPATIENT
Start: 2018-11-27 | End: 2018-11-27

## 2018-11-27 RX ORDER — FENTANYL CITRATE/PF 50 MCG/ML
50 SYRINGE (ML) INJECTION
Status: DISCONTINUED | OUTPATIENT
Start: 2018-11-27 | End: 2018-11-27 | Stop reason: HOSPADM

## 2018-11-27 RX ORDER — SODIUM CHLORIDE 9 MG/ML
125 INJECTION, SOLUTION INTRAVENOUS CONTINUOUS
Status: DISCONTINUED | OUTPATIENT
Start: 2018-11-27 | End: 2018-11-27 | Stop reason: HOSPADM

## 2018-11-27 RX ORDER — CEPHALEXIN 500 MG/1
500 CAPSULE ORAL EVERY 6 HOURS SCHEDULED
Qty: 28 CAPSULE | Refills: 0 | Status: SHIPPED | OUTPATIENT
Start: 2018-11-27 | End: 2018-12-04

## 2018-11-27 RX ORDER — CEFAZOLIN SODIUM 1 G/50ML
1000 SOLUTION INTRAVENOUS ONCE
Status: COMPLETED | OUTPATIENT
Start: 2018-11-27 | End: 2018-11-27

## 2018-11-27 RX ORDER — FENTANYL CITRATE 50 UG/ML
INJECTION, SOLUTION INTRAMUSCULAR; INTRAVENOUS AS NEEDED
Status: DISCONTINUED | OUTPATIENT
Start: 2018-11-27 | End: 2018-11-27 | Stop reason: SURG

## 2018-11-27 RX ORDER — MIDAZOLAM HYDROCHLORIDE 1 MG/ML
INJECTION INTRAMUSCULAR; INTRAVENOUS AS NEEDED
Status: DISCONTINUED | OUTPATIENT
Start: 2018-11-27 | End: 2018-11-27 | Stop reason: SURG

## 2018-11-27 RX ORDER — SODIUM CHLORIDE, SODIUM LACTATE, POTASSIUM CHLORIDE, CALCIUM CHLORIDE 600; 310; 30; 20 MG/100ML; MG/100ML; MG/100ML; MG/100ML
50 INJECTION, SOLUTION INTRAVENOUS CONTINUOUS
Status: DISCONTINUED | OUTPATIENT
Start: 2018-11-27 | End: 2018-11-27 | Stop reason: HOSPADM

## 2018-11-27 RX ORDER — ONDANSETRON 2 MG/ML
INJECTION INTRAMUSCULAR; INTRAVENOUS AS NEEDED
Status: DISCONTINUED | OUTPATIENT
Start: 2018-11-27 | End: 2018-11-27 | Stop reason: SURG

## 2018-11-27 RX ORDER — ACETAMINOPHEN 325 MG/1
975 TABLET ORAL ONCE
Status: COMPLETED | OUTPATIENT
Start: 2018-11-27 | End: 2018-11-27

## 2018-11-27 RX ORDER — PROPOFOL 10 MG/ML
INJECTION, EMULSION INTRAVENOUS AS NEEDED
Status: DISCONTINUED | OUTPATIENT
Start: 2018-11-27 | End: 2018-11-27 | Stop reason: SURG

## 2018-11-27 RX ORDER — NAPROXEN SODIUM 500 MG/1
500 TABLET, FILM COATED, EXTENDED RELEASE ORAL DAILY PRN
Qty: 10 TABLET | Refills: 0 | Status: SHIPPED | OUTPATIENT
Start: 2018-11-27 | End: 2019-07-24 | Stop reason: ALTCHOICE

## 2018-11-27 RX ORDER — LIDOCAINE HYDROCHLORIDE AND EPINEPHRINE 10; 10 MG/ML; UG/ML
INJECTION, SOLUTION INFILTRATION; PERINEURAL AS NEEDED
Status: DISCONTINUED | OUTPATIENT
Start: 2018-11-27 | End: 2018-11-27 | Stop reason: HOSPADM

## 2018-11-27 RX ORDER — GABAPENTIN 300 MG/1
300 CAPSULE ORAL ONCE
Status: COMPLETED | OUTPATIENT
Start: 2018-11-27 | End: 2018-11-27

## 2018-11-27 RX ORDER — ONDANSETRON 2 MG/ML
4 INJECTION INTRAMUSCULAR; INTRAVENOUS ONCE AS NEEDED
Status: DISCONTINUED | OUTPATIENT
Start: 2018-11-27 | End: 2018-11-27 | Stop reason: HOSPADM

## 2018-11-27 RX ADMIN — SODIUM CHLORIDE: 0.9 INJECTION, SOLUTION INTRAVENOUS at 10:26

## 2018-11-27 RX ADMIN — SODIUM CHLORIDE 125 ML/HR: 0.9 INJECTION, SOLUTION INTRAVENOUS at 08:52

## 2018-11-27 RX ADMIN — ACETAMINOPHEN 975 MG: 325 TABLET, FILM COATED ORAL at 07:43

## 2018-11-27 RX ADMIN — FENTANYL CITRATE 50 MCG: 50 INJECTION, SOLUTION INTRAMUSCULAR; INTRAVENOUS at 09:25

## 2018-11-27 RX ADMIN — OXYCODONE AND ACETAMINOPHEN 1 TABLET: 5; 325 TABLET ORAL at 13:50

## 2018-11-27 RX ADMIN — ONDANSETRON HYDROCHLORIDE 4 MG: 2 INJECTION, SOLUTION INTRAVENOUS at 12:08

## 2018-11-27 RX ADMIN — FENTANYL CITRATE 50 MCG: 50 INJECTION, SOLUTION INTRAMUSCULAR; INTRAVENOUS at 12:09

## 2018-11-27 RX ADMIN — PROPOFOL 200 MG: 10 INJECTION, EMULSION INTRAVENOUS at 09:25

## 2018-11-27 RX ADMIN — MIDAZOLAM 2 MG: 1 INJECTION INTRAMUSCULAR; INTRAVENOUS at 09:12

## 2018-11-27 RX ADMIN — CEFAZOLIN SODIUM 1000 MG: 1 SOLUTION INTRAVENOUS at 09:12

## 2018-11-27 RX ADMIN — GABAPENTIN 300 MG: 300 CAPSULE ORAL at 07:43

## 2018-11-27 NOTE — INTERVAL H&P NOTE
H&P reviewed  CV RRR, L CTA b/l  After examining the patient I find no changes in the patients condition since the H&P had been written

## 2018-11-27 NOTE — DISCHARGE INSTRUCTIONS
Keep ear bolster on until follow-up appointment on Friday with Dr Darrell Clement  May shower tomorrow  If showering, wear shower cap to avoid getting bolster wet

## 2018-11-27 NOTE — OP NOTE
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]    Procedure(s) (LRB):  OTOPLASTY (Right)  EXCISION CONDYLOMA GROIN (N/A)    Specimen(s):  ID Type Source Tests Collected by Time Destination   1 : Right groin superior medial lesion Tissue Lesion TISSUE EXAM Lucian Mclean MD 2018 1214    2 : Right groin inferior medial lesion  Tissue Lesion TISSUE EXAM Lucian Mclean MD 2018 1214    3 : Right groin lateral lesion Tissue Lesion TISSUE EXAM Lucian Mclean MD 2018 1215        Estimated Blood Loss:   Minimal    Drains:       Anesthesia Type:   General    Operative Indications:  Condyloma [A63 0]  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]      Operative Findings:  Weak cartilage of helical rim  Absence of anti helical fold    Complications:   None    Procedure and Technique:  68-year-old male with prominent ear and lop ear deformity who underwent bilateral otoplasty as a child  Unfortunately, the right otoplasty failed and he presents with asymmetry  He presents today for right otoplasty  He also has 3 right groin condyloma is wishes to have excised  I discussed risks, benefits and alternatives of the procedure including not limited to risk of bleeding, infection, scar and recurrence  He understands these risks and wishes to proceed  I personally obtained informed consent  The patient was identified the sites were marked in preop holding  The patient was taken to the operating room where he was placed in supine position  After successful induction of general anesthesia with LMA, the patient was prepped with Betadine and draped in standard sterile fashion  A time-out was performed    The patient, site and procedures were verified  The right ear was infused with 10 cc of 1% lidocaine with epinephrine  The posterior ear scar from prior otoplasty was incised with a 15 blade scalpel  Dissection was carried out with a 15 blade scalpel down to perichondrium  The skin was elevated off of perichondrium 1st posteriorly and then anteriorly  The anti helical fold was absent on the right and this was recreated with Mustarde E sutures using 5 0 nylon sutures  Measurements were taken from the left ear and transferred to the right ear to recreate the fold  Using a 27 gauge hypodermic needle, the anterior and posterior aspects of the antihelical folds were marked and Mustarde sutures were placed 1st inferiorly and progressing superiorly  A total of 4 Mustarde sutures were placed  The right superior helical fold was then addressed  Dissection of the skin was continued off of the perichondrium from posterior-to-anterior  The cartilage of the helical rim was very weak and had a caved in appearance  Mustarde he sutures were placed from anterior to posterior to stabilize the rim and recreate the curvature as on the left side  Three separate was started sutures were placed along the rim  The skin was then redraped and the ears were examined for symmetry which appeared satisfactory  Hemostasis was checked and obtained with electrocautery  The skin was then closed posteriorly with buried interrupted 5 O Monocryl sutures followed by running 6 0 nylon suture  The closure was dressed with bacitracin ointment and Xeroform  Two Xeroform bolster dressings were then placed in the helical fold and secured with 3-0 Prolene sutures  A [place] ear splint was then placed over the right ear  The right groin condyloma was were then addressed  The condylomas were excised with a 15 blade scalpel with oblique elliptical excisions  The skin edges were then undermined extensively to permit tension-free closure    The condylomas were sent separately in formalin solution  Hemostasis was achieved with electrocautery  The skin was then closed with buried interrupted 4-0 Monocryl sutures followed by running 4-0 Monocryl subcuticular suture followed by histoacryl skin glue  The condylomata measured 7 mm, 4 mm and 3 mm  The closures measured 1 5 cm, 1 cm and 1 cm  The patient tolerated the procedure well, emerged from general anesthesia, the LMA was removed and the patient was taken to PACU in stable condition       I was present for the entire procedure    Patient Disposition:  PACU     SIGNATURE: Che Stock MD  DATE: November 27, 2018  TIME: 12:58 PM

## 2018-11-27 NOTE — ANESTHESIA PREPROCEDURE EVALUATION
Review of Systems/Medical History  Patient summary reviewed  Chart reviewed  No history of anesthetic complications     Cardiovascular  Negative cardio ROS    Pulmonary  Negative pulmonary ROS        GI/Hepatic  Negative GI/hepatic ROS          Negative  ROS        Endo/Other    Comment: Herpes  Genital warts    GYN  Negative gynecology ROS          Hematology  Negative hematology ROS      Musculoskeletal  Negative musculoskeletal ROS        Neurology  Negative neurology ROS      Psychology   Negative psychology ROS              Physical Exam    Airway    Mallampati score: II  TM Distance: >3 FB  Neck ROM: full     Dental   No notable dental hx     Cardiovascular  Comment: Negative ROS, Rhythm: regular, Rate: normal, Cardiovascular exam normal    Pulmonary  Pulmonary exam normal Breath sounds clear to auscultation,     Other Findings        Anesthesia Plan  ASA Score- 2     Anesthesia Type- general with ASA Monitors  Additional Monitors:   Airway Plan:         Plan Factors-Patient not instructed to abstain from smoking on day of procedure  Patient did not smoke on day of surgery  Induction- intravenous  Postoperative Plan-     Informed Consent- Anesthetic plan and risks discussed with patient

## 2018-11-27 NOTE — INTERIM OP NOTE
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]    Procedure(s) (LRB):  OTOPLASTY (Right)  EXCISION CONDYLOMA GROIN (N/A)    Surgeon(s) and Role:     * Afua Richard MD - Primary     * Dany Stovall MD - Fellow    Specimens:  ID Type Source Tests Collected by Time Destination   1 : Right groin superior medial lesion Tissue Lesion TISSUE EXAM Afua Richard MD 2018 1214    2 : Right groin inferior medial lesion  Tissue Lesion TISSUE EXAM Afua Richard MD 2018 1214    3 : Right groin lateral lesion Tissue Lesion TISSUE EXAM Afua Richard MD 2018 1215        Estimated Blood Loss:   Minimal    Anesthesia Type:   General     Findings:    R otoplasty, R groin condyloma lesion excisionx3  Complications:   None    SIGNATURE: Dany Stovall MD   DATE: 2018   TIME: 12:46 PM

## 2018-11-30 ENCOUNTER — OFFICE VISIT (OUTPATIENT)
Dept: PLASTIC SURGERY | Facility: CLINIC | Age: 43
End: 2018-11-30

## 2018-11-30 VITALS — WEIGHT: 142 LBS | HEIGHT: 71 IN | BODY MASS INDEX: 19.88 KG/M2

## 2018-11-30 DIAGNOSIS — A63.0 CONDYLOMA: Primary | ICD-10-CM

## 2018-11-30 DIAGNOSIS — Q17.3 LOP EAR: ICD-10-CM

## 2018-11-30 PROCEDURE — 99024 POSTOP FOLLOW-UP VISIT: CPT | Performed by: PHYSICIAN ASSISTANT

## 2018-11-30 NOTE — PROGRESS NOTES
Ear bolster removed  Sutures maintained  Incisions CDI  OK to shower  Bacitracin to ear bid  Groin incisions CDI, histacryl  He will return in 1 week for suture removal     Naproxen bid

## 2018-12-03 ENCOUNTER — TELEPHONE (OUTPATIENT)
Dept: PLASTIC SURGERY | Facility: CLINIC | Age: 43
End: 2018-12-03

## 2018-12-03 NOTE — TELEPHONE ENCOUNTER
Patient calling to request a refill for his naproxen if  needed  States he is not experiencing any pain at this time but is still swollen  Wondering if he should continue naproxen for this reason  Aware that Dr Melissa Cortez and EMMANUEL Huynh are in surgery today, but that I will get back to him with their advice  Last OV 11/30/18  Next OV 12/5/18

## 2018-12-04 NOTE — TELEPHONE ENCOUNTER
Discussed below with EMMANUEL Sutton who advises that patient wait until follow up visit to discuss need for refill of naproxen  Left message for patient with information above

## 2018-12-05 ENCOUNTER — OFFICE VISIT (OUTPATIENT)
Dept: PLASTIC SURGERY | Facility: CLINIC | Age: 43
End: 2018-12-05

## 2018-12-05 VITALS — WEIGHT: 142 LBS | BODY MASS INDEX: 19.88 KG/M2 | HEIGHT: 71 IN

## 2018-12-05 DIAGNOSIS — Q17.3 LOP EAR: Primary | ICD-10-CM

## 2018-12-05 PROCEDURE — 99024 POSTOP FOLLOW-UP VISIT: CPT | Performed by: PHYSICIAN ASSISTANT

## 2018-12-05 NOTE — PROGRESS NOTES
11/27/18 OTOPLASTY (Right Ear)      EXCISION CONDYLOMA GROIN (N/A Groin)    Sutures removed today without difficulty  Groin incision healing well  Patient expresses concern about the swelling  Dr Francesca Mccormick met with patient and discussed expectations  He explained that swelling will improve over time  He will follow up in one month

## 2018-12-07 ENCOUNTER — TELEPHONE (OUTPATIENT)
Dept: PLASTIC SURGERY | Facility: CLINIC | Age: 43
End: 2018-12-07

## 2018-12-07 NOTE — TELEPHONE ENCOUNTER
Patient s/p otoplasty 11/27/18  Would like to know when he will be cleared to ski and wear a helmet  Dr Mari Grant - please advise  Thank you

## 2018-12-10 NOTE — TELEPHONE ENCOUNTER
Reviewed with patient that per Dr Alley Lopez, he may resume skiing and wear a helmet after 12/27/18  Verbalized understanding

## 2018-12-27 ENCOUNTER — TELEPHONE (OUTPATIENT)
Dept: UROLOGY | Facility: AMBULATORY SURGERY CENTER | Age: 43
End: 2018-12-27

## 2018-12-27 NOTE — TELEPHONE ENCOUNTER
WORKING ON DR Estefany King R/S FOR 02/06/19, PLEASE ADVISE ON APPT, PT NEEDS SEEN FOR 6M F/U HE ONLY WANTS TO SEE FT

## 2018-12-27 NOTE — TELEPHONE ENCOUNTER
Since he chooses to see Dr Josef Lester he can be scheduled in next available follow up appointment with Dr Josef Lester at whichever location he chooses to be seen in

## 2019-01-02 NOTE — TELEPHONE ENCOUNTER
01/02/19 @ 9:09A 2ND ATTEMPT TO CONTACT PT [facility] FOR PT TO CALL THE OFFICE TO R/S, LETTER MAILED * TL5

## 2019-01-08 ENCOUNTER — TELEPHONE (OUTPATIENT)
Dept: PLASTIC SURGERY | Facility: CLINIC | Age: 44
End: 2019-01-08

## 2019-01-08 NOTE — TELEPHONE ENCOUNTER
Patient called and left message regarding incision, patient stated that the incision was red and inflamed  Asked patient to send via e-mail photo and that I would forward to Dr Danisha Rosado  Forwarded photo to Dr Danisha Rosado responded and stated for patient to place bacitracin and Band-Aid  Called patient and informed him  Patient stated understanding

## 2019-03-15 ENCOUNTER — OFFICE VISIT (OUTPATIENT)
Dept: PLASTIC SURGERY | Facility: CLINIC | Age: 44
End: 2019-03-15

## 2019-03-15 VITALS — WEIGHT: 142 LBS | HEIGHT: 71 IN | BODY MASS INDEX: 19.88 KG/M2

## 2019-03-15 DIAGNOSIS — A63.0 CONDYLOMA: Primary | ICD-10-CM

## 2019-03-15 DIAGNOSIS — Q17.3 LOP EAR: ICD-10-CM

## 2019-03-15 PROCEDURE — 99024 POSTOP FOLLOW-UP VISIT: CPT | Performed by: PHYSICIAN ASSISTANT

## 2019-03-15 NOTE — PROGRESS NOTES
11/27/18 OTOPLASTY (Right Ear)      EXCISION CONDYLOMA GROIN (N/A Groin)    Pt presented today to discuss an asymmetry of his ears  The right ear protrudes slightly more that the left  The helix on the left is closer to his scalp than his anthelix  He would like the right side to match as much as possible  Dr Danisha Rosado feels he can do this in the office  The patient also expressed concern about the coloration of the scar from the condyloma excision in his groin  Dr Danisha Rosado suggested biocorneum, which the patient purchased today  He will be scheduled for the office procedure

## 2019-04-03 ENCOUNTER — OFFICE VISIT (OUTPATIENT)
Dept: UROLOGY | Facility: AMBULATORY SURGERY CENTER | Age: 44
End: 2019-04-03
Payer: COMMERCIAL

## 2019-04-03 VITALS
BODY MASS INDEX: 20.02 KG/M2 | DIASTOLIC BLOOD PRESSURE: 76 MMHG | HEIGHT: 71 IN | WEIGHT: 143 LBS | HEART RATE: 66 BPM | SYSTOLIC BLOOD PRESSURE: 124 MMHG

## 2019-04-03 DIAGNOSIS — B08.1 MOLLUSCUM CONTAGIOSUM: Primary | ICD-10-CM

## 2019-04-03 PROCEDURE — 99213 OFFICE O/P EST LOW 20 MIN: CPT | Performed by: UROLOGY

## 2019-04-12 ENCOUNTER — PROCEDURE VISIT (OUTPATIENT)
Dept: PLASTIC SURGERY | Facility: CLINIC | Age: 44
End: 2019-04-12

## 2019-04-12 VITALS — WEIGHT: 145 LBS | BODY MASS INDEX: 20.3 KG/M2 | HEIGHT: 71 IN

## 2019-04-12 DIAGNOSIS — Q17.3 LOP EAR DEFORMITY: Primary | ICD-10-CM

## 2019-04-12 PROCEDURE — RECHECK: Performed by: SURGERY

## 2019-04-12 RX ORDER — CEFADROXIL 500 MG/1
500 CAPSULE ORAL EVERY 12 HOURS SCHEDULED
Qty: 10 CAPSULE | Refills: 0 | Status: SHIPPED | OUTPATIENT
Start: 2019-04-12 | End: 2019-04-17

## 2019-04-12 RX ORDER — CLINDAMYCIN PHOSPHATE 10 MG/G
GEL TOPICAL
Refills: 5 | COMMUNITY
Start: 2019-04-05

## 2019-04-19 ENCOUNTER — OFFICE VISIT (OUTPATIENT)
Dept: PLASTIC SURGERY | Facility: CLINIC | Age: 44
End: 2019-04-19

## 2019-04-19 DIAGNOSIS — Q17.3 CUP EAR DEFORMITY: ICD-10-CM

## 2019-04-19 DIAGNOSIS — Q17.3 LOP EAR: Primary | ICD-10-CM

## 2019-04-19 PROCEDURE — 99024 POSTOP FOLLOW-UP VISIT: CPT | Performed by: SURGERY

## 2019-04-29 PROBLEM — Q17.3 CUP EAR DEFORMITY: Status: ACTIVE | Noted: 2019-04-29

## 2019-07-24 ENCOUNTER — OFFICE VISIT (OUTPATIENT)
Dept: PLASTIC SURGERY | Facility: CLINIC | Age: 44
End: 2019-07-24

## 2019-07-24 VITALS — BODY MASS INDEX: 20.06 KG/M2 | WEIGHT: 143.25 LBS | HEIGHT: 71 IN

## 2019-07-24 DIAGNOSIS — Q17.3 CUP EAR DEFORMITY: Primary | ICD-10-CM

## 2019-07-24 PROCEDURE — RECHECK: Performed by: SURGERY

## 2019-07-24 NOTE — ASSESSMENT & PLAN NOTE
44-year-old male status post right otoplasty 8 months ago who presents for follow-up evaluation  He has healed very well  He is satisfied with the symmetry  The right lobule appears to be slightly more prominent than left  This may be secondary to swelling  The right ear is mildly tender along the helix  Overall he is pleased with the result  He will follow up in 4 months to re-evaluate

## 2019-07-24 NOTE — PROGRESS NOTES
Cup ear deformity  57-year-old male status post right otoplasty 8 months ago who presents for follow-up evaluation  He has healed very well  He is satisfied with the symmetry  The right lobule appears to be slightly more prominent than left  This may be secondary to swelling  The right ear is mildly tender along the helix  Overall he is pleased with the result  He will follow up in 4 months to re-evaluate

## 2025-04-22 NOTE — ASSESSMENT & PLAN NOTE
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 who had a previous otoplasty as a teenager [place] in [place]  The lop ear deformity recurred  He presents today for evaluation and correction  I recommended revision otoplasty  I would resect a portion of the medial posterior devang bowl and suture the cartilage back to mastoid fascia  I would also contour the helical rim with cartilage scoring and mustarde sutures  I discussed this plan with him in detail in measured as were obtained  I discussed risks, benefits and alternatives of the procedure including not limited to risk of bleeding, infection, scar and recurrent ear deformity  He understood and wished to proceed  I personally obtained his informed consent  We will submit to his insurance for prior authorization 
72-year-old male with right inguinal region condylomata  He requested to see a plastic surgeon for excision and closure to minimize scarring  I am seeing him in consultation for urology  I recommended excision with complex closure  It he has condylomata in a 6 millimeters and 3 millimeters I discussed with him risks, benefits and alternatives of the procedure including not limited to risk of bleeding, infection, scar and recurrence  He understood and wished to proceed  I personally obtained his informed consent  We will schedule him for surgery on an outpatient basis 
feeling hopeless

## (undated) DEVICE — OCCLUSIVE GAUZE STRIP,3% BISMUTH TRIBROMOPHENATE IN PETROLATUM BLEND: Brand: XEROFORM

## (undated) DEVICE — ASTOUND STANDARD SURGICAL GOWN, XL: Brand: CONVERTORS

## (undated) DEVICE — STRL PENROSE DRAIN 18" X 1/4": Brand: CARDINAL HEALTH

## (undated) DEVICE — STANDARD SURGICAL GOWN, L: Brand: CONVERTORS

## (undated) DEVICE — NEEDLE 25G X 1 1/2

## (undated) DEVICE — 3M™ STERI-STRIP™ REINFORCED ADHESIVE SKIN CLOSURES, R1547, 1/2 IN X 4 IN (12 MM X 100 MM), 6 STRIPS/ENVELOPE: Brand: 3M™ STERI-STRIP™

## (undated) DEVICE — GLOVE INDICATOR PI UNDERGLOVE SZ 8 BLUE

## (undated) DEVICE — KERLIX BANDAGE ROLL: Brand: KERLIX

## (undated) DEVICE — SCD SEQUENTIAL COMPRESSION COMFORT SLEEVE MEDIUM KNEE LENGTH: Brand: KENDALL SCD

## (undated) DEVICE — CURITY STRETCH BANDAGE: Brand: CURITY

## (undated) DEVICE — SKIN MARKER DUAL TIP WITH RULER CAP, FLEXIBLE RULER AND LABELS: Brand: DEVON

## (undated) DEVICE — COBAN 4 IN STERILE

## (undated) DEVICE — GLOVE INDICATOR PI UNDERGLOVE SZ 6.5 BLUE

## (undated) DEVICE — GLOVE SRG BIOGEL 7

## (undated) DEVICE — ELECTRODE NEEDLE MEGAFINE 2IN E-Z CLEAN MEGADYNE -0118

## (undated) DEVICE — BETHLEHEM UNIVERSAL MINOR GEN: Brand: CARDINAL HEALTH

## (undated) DEVICE — SUT NYLON 5-0 P-3 18 IN 690G

## (undated) DEVICE — INTENDED FOR TISSUE SEPARATION, AND OTHER PROCEDURES THAT REQUIRE A SHARP SURGICAL BLADE TO PUNCTURE OR CUT.: Brand: BARD-PARKER ® CARBON RIB-BACK BLADES

## (undated) DEVICE — UNDYED MONOFILAMENT (POLYDIOXANONE), ABSORBABLE SYNTHETIC SURGICAL SUTURE: Brand: PDS

## (undated) DEVICE — GLOVE SRG BIOGEL 6.5

## (undated) DEVICE — 10FR FRAZIER SUCTION HANDLE: Brand: CARDINAL HEALTH

## (undated) DEVICE — NEEDLE 30 G X 1/2

## (undated) DEVICE — SUT MONOCRYL 4-0 PS-2 27 IN Y426H

## (undated) DEVICE — NEEDLE 18 G X 1 1/2

## (undated) DEVICE — GLOVE SRG BIOGEL 6

## (undated) DEVICE — DRESSING XEROFORM 5 X 9

## (undated) DEVICE — GLOVE INDICATOR PI UNDERGLOVE SZ 7.5 BLUE

## (undated) DEVICE — COTTON BALLS: Brand: DEROYAL

## (undated) DEVICE — TUBING SUCTION 5MM X 12 FT

## (undated) DEVICE — PENCIL ELECTROSURG E-Z CLEAN -0035H

## (undated) DEVICE — TIBURON SPLIT SHEET: Brand: CONVERTORS

## (undated) DEVICE — GLOVE SRG BIOGEL 7.5

## (undated) DEVICE — GAUZE SPONGES,16 PLY: Brand: CURITY

## (undated) DEVICE — ADHESIVE SKN CLSR HISTOACRYL FLEX 0.5ML LF

## (undated) DEVICE — SUT ETHILON 6-0 PS-3 18 IN 1665G

## (undated) DEVICE — Device

## (undated) DEVICE — DRAPE SHEET THREE QUARTER

## (undated) DEVICE — SUT MONOCRYL 5-0 P-3 18 IN Y493G